# Patient Record
Sex: FEMALE | Race: WHITE | NOT HISPANIC OR LATINO | URBAN - METROPOLITAN AREA
[De-identification: names, ages, dates, MRNs, and addresses within clinical notes are randomized per-mention and may not be internally consistent; named-entity substitution may affect disease eponyms.]

---

## 2018-06-04 ENCOUNTER — EMERGENCY (EMERGENCY)
Facility: HOSPITAL | Age: 53
LOS: 1 days | Discharge: HOME | End: 2018-06-04
Attending: EMERGENCY MEDICINE | Admitting: EMERGENCY MEDICINE
Payer: COMMERCIAL

## 2018-06-04 VITALS
HEART RATE: 97 BPM | DIASTOLIC BLOOD PRESSURE: 93 MMHG | SYSTOLIC BLOOD PRESSURE: 168 MMHG | WEIGHT: 154.98 LBS | TEMPERATURE: 98 F | OXYGEN SATURATION: 94 % | RESPIRATION RATE: 17 BRPM | HEIGHT: 62 IN

## 2018-06-04 VITALS — OXYGEN SATURATION: 100 %

## 2018-06-04 PROCEDURE — 99284 EMERGENCY DEPT VISIT MOD MDM: CPT | Mod: 25

## 2018-06-04 PROCEDURE — 94664 DEMO&/EVAL PT USE INHALER: CPT

## 2018-06-04 PROCEDURE — 99284 EMERGENCY DEPT VISIT MOD MDM: CPT

## 2018-06-04 PROCEDURE — 94640 AIRWAY INHALATION TREATMENT: CPT

## 2018-06-04 RX ORDER — LEVALBUTEROL 1.25 MG/.5ML
0 SOLUTION, CONCENTRATE RESPIRATORY (INHALATION)
Qty: 0 | Refills: 0 | COMMUNITY

## 2018-06-04 RX ORDER — LEVOTHYROXINE SODIUM 125 MCG
1 TABLET ORAL
Qty: 0 | Refills: 0 | COMMUNITY

## 2018-06-04 RX ORDER — IPRATROPIUM/ALBUTEROL SULFATE 18-103MCG
3 AEROSOL WITH ADAPTER (GRAM) INHALATION ONCE
Qty: 0 | Refills: 0 | Status: COMPLETED | OUTPATIENT
Start: 2018-06-04 | End: 2018-06-04

## 2018-06-04 RX ORDER — MOMETASONE FUROATE AND FORMOTEROL FUMARATE DIHYDRATE 200; 5 UG/1; UG/1
2 AEROSOL RESPIRATORY (INHALATION)
Qty: 0 | Refills: 0 | COMMUNITY

## 2018-06-04 RX ORDER — AMLODIPINE BESYLATE 2.5 MG/1
1 TABLET ORAL
Qty: 0 | Refills: 0 | COMMUNITY

## 2018-06-04 RX ADMIN — Medication 3 MILLILITER(S): at 23:26

## 2018-06-04 RX ADMIN — Medication 60 MILLIGRAM(S): at 23:00

## 2018-06-04 RX ADMIN — Medication 3 MILLILITER(S): at 22:27

## 2018-06-04 NOTE — ED ADULT TRIAGE NOTE - CHIEF COMPLAINT QUOTE
53 yr. old female c/o exacerbation of asthma x 2 ,3 days, increasingly difficult to breath tonight.  Audible wheezing.

## 2018-06-04 NOTE — ED PROVIDER NOTE - OBJECTIVE STATEMENT
52 y/o F pt with hx of HTN and asthma presents to the ED c/o SOB and dyspnea for a couple of days. Pt states that she had similar sx 3 weeks ago, for which she took steroids. She has had an exacerbation of her allergies with nasal congestion associated. Also associated with productive coughing. Pt has never been hospitalized for asthma exacerbations in the past. Non-smoker. NKDA. Denies fever, chills, chest pain, abd pain, rash,  nausea, or vomiting. No other complaints at this time.     Dr. Rader - pmd

## 2018-06-04 NOTE — ED PROVIDER NOTE - PROGRESS NOTE DETAILS
Patient feels much better. Lungs almost clear except for faint end exp. wheeze. Patient has nebulizer at home, and prefers to go home and will take further treatments if she feels SOB again

## 2018-06-04 NOTE — ED PROVIDER NOTE - NS_ ATTENDINGSCRIBEDETAILS _ED_A_ED_FT
Ramos Guillermo MD - The scribe's documentation has been prepared under my direction and personally reviewed by me in its entirety. I confirm that the note above accurately reflects all work, treatment, procedures, and medical decision making performed by me.